# Patient Record
Sex: FEMALE | Race: WHITE | NOT HISPANIC OR LATINO | URBAN - METROPOLITAN AREA
[De-identification: names, ages, dates, MRNs, and addresses within clinical notes are randomized per-mention and may not be internally consistent; named-entity substitution may affect disease eponyms.]

---

## 2017-09-16 ENCOUNTER — EMERGENCY (EMERGENCY)
Facility: HOSPITAL | Age: 82
LOS: 0 days | Discharge: HOME | End: 2017-09-16
Attending: EMERGENCY MEDICINE | Admitting: EMERGENCY MEDICINE
Payer: MEDICARE

## 2017-09-16 VITALS
DIASTOLIC BLOOD PRESSURE: 54 MMHG | HEART RATE: 70 BPM | RESPIRATION RATE: 18 BRPM | SYSTOLIC BLOOD PRESSURE: 152 MMHG | OXYGEN SATURATION: 98 % | TEMPERATURE: 99 F

## 2017-09-16 VITALS — WEIGHT: 134.04 LBS | HEIGHT: 62 IN
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
## 2017-09-16 DIAGNOSIS — Z79.82 LONG TERM (CURRENT) USE OF ASPIRIN: ICD-10-CM

## 2017-09-16 DIAGNOSIS — S39.82XA OTHER SPECIFIED INJURIES OF LOWER BACK, INITIAL ENCOUNTER: ICD-10-CM

## 2017-09-16 DIAGNOSIS — I10 ESSENTIAL (PRIMARY) HYPERTENSION: ICD-10-CM
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
## 2017-09-16 DIAGNOSIS — S32.038A OTHER FRACTURE OF THIRD LUMBAR VERTEBRA, INITIAL ENCOUNTER FOR CLOSED FRACTURE: ICD-10-CM
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
## 2017-09-16 PROCEDURE — 72190 X-RAY EXAM OF PELVIS: CPT | Mod: 26

## 2017-09-16 PROCEDURE — 99285 EMERGENCY DEPT VISIT HI MDM: CPT

## 2017-09-16 PROCEDURE — 72192 CT PELVIS W/O DYE: CPT | Mod: 26

## 2017-09-16 PROCEDURE — 72131 CT LUMBAR SPINE W/O DYE: CPT | Mod: 26

## 2017-09-16 NOTE — ED STATDOCS - OBJECTIVE STATEMENT
85 y/o female with PMHx of HTN, on baby ASA s/p perforated ulcer surgery presents to the ED c/o lower back pain starting last night. Pt states her  fell last night and pt tried to pick him up when she heard a "click" and has been experiencing pain since then. Pt states up until the back injury she was able to take care of herself and her . No concerned because she can not bend. No hip pain. Is able to ambulate.

## 2017-09-16 NOTE — ED STATDOCS - ATTENDING CONTRIBUTION TO CARE
I, Lenny Stubbs, performed the initial face to face bedside interview with this patient regarding history of present illness, review of symptoms and relevant past medical, social and family history.  I completed an independent physical examination.  I was the initial provider who evaluated this patient. I have signed out the follow up of any pending tests (i.e. labs, radiological studies) to the ACP.  I have communicated the patient’s plan of care and disposition with the ACP.  The history, relevant review of systems, past medical and surgical history, medical decision making, and physical examination was documented by the scribe in my presence and I attest to the accuracy of the documentation.

## 2017-09-16 NOTE — ED STATDOCS - PROGRESS NOTE DETAILS
ASHLY Gilbert:   Patient has been seen, evaluated and orders have been written by the attending in intake. Patient is stable.  I will follow up the results of orders written and I will continue to evaluate/observe the patient.  paged Dr. Burkett.    Gisell Gilbert PA-C [facility].  Gisell Gilbert PA-C Dr. Burkett aware of CT results.  Neurologically intact and ambulatory may follow in office next week.  Gisell Gilbert PA-C Pt. has Percocet at home.  Gisell Gilbert PA-C [facility]:  S/w Spine PA.  Pt can f/u in office as needed.

## 2022-08-26 NOTE — ED STATDOCS - DATA REVIEWED, MDM
Needs refused-pt needs an appt. Routing to a pcp in office that can refuse until pt makes appt.
vital signs

## 2023-04-07 NOTE — ED STATDOCS - CHPI ED AGGRAVATING FACTORS
Assessment/Plan:      Diagnoses and all orders for this visit:    Vaginal discharge    Bacterial vaginosis  -     POCT wet mount  -     metroNIDAZOLE (FLAGYL) 500 mg tablet; Take 1 tablet (500 mg total) by mouth every 12 (twelve) hours for 7 days    Contraceptive education      -Reviewed diagnosis of bacterial vaginosis and treatment with metronidazole  Written information provided  Patient verbalized understanding to avoid alcohol use while taking medication  -Hygiene reviewed including avoid scented soaps, lotions and lubricants; avoid tight/restrictive clothing; avoid douching  -Reviewed Nexplanon is effective for 3 years  Insertion and removal procedures reviewed  Common side effects including irregular/unpredictable vaginal bleeding and weight gain reviewed  Written information provided  Insurance information provided  Patient is encouraged to come with menses for insertion  Encouraged to call insurance for coverage prior to appointment  -Reviewed with patient she is due for annual exam with repeat Pap smear  Last Pap smear was 2019  -Signs and symptoms to report reviewed    RTO annual exam    Subjective:     Patient ID: Ramiro Rosales is a 29 y o  female  HPI  here with complaints of vaginal odor intermittently for about 2 months  LMP 3/17/23  Vaginal odor is described as fishy/foul  Associated symptoms include minimal amount of vaginal discharge  Aggravating factors include intercourse and menses  alleviating factors include immediately after showering  Has not used any OTC remedies  Similar episodes in the past when she had the IUD  Is currently sexually active using rhythm method for contraception  Denies new partner, pelvic pain, fever, chills, bowel or bladder concerns  Is also interested in discussing Nexplanon    Last Pap smear 12/3/19 NILM  Completed Gardasil vaccine series    Review of Systems   Constitutional: Negative for chills, fatigue and fever     Respiratory: "Negative  Cardiovascular: Negative  Genitourinary: Negative for decreased urine volume, difficulty urinating, dyspareunia, dysuria, enuresis, flank pain, frequency, genital sores, hematuria, menstrual problem, pelvic pain, urgency, vaginal bleeding, vaginal discharge and vaginal pain  Vaginal odor          Objective:  /80   Ht 5' 2\" (1 575 m)   Wt 54 9 kg (121 lb)   LMP 03/17/2023   BMI 22 13 kg/m²      Physical Exam  Vitals reviewed  Exam conducted with a chaperone present  Constitutional:       Appearance: Normal appearance  Genitourinary:     General: Normal vulva  Exam position: Lithotomy position  Labia:         Right: No rash, tenderness, lesion or injury  Left: No rash, tenderness, lesion or injury  Vagina: Vaginal discharge present  Cervix: Normal       Comments: Moderate to large amount of thin gray vaginal discharge noted on exam  Neurological:      Mental Status: She is alert and oriented to person, place, and time     Psychiatric:         Mood and Affect: Mood normal          Behavior: Behavior normal          " nothing